# Patient Record
Sex: FEMALE | Race: OTHER | HISPANIC OR LATINO
[De-identification: names, ages, dates, MRNs, and addresses within clinical notes are randomized per-mention and may not be internally consistent; named-entity substitution may affect disease eponyms.]

---

## 2017-01-06 PROBLEM — Z00.00 ENCOUNTER FOR PREVENTIVE HEALTH EXAMINATION: Status: ACTIVE | Noted: 2017-01-06

## 2017-01-25 ENCOUNTER — APPOINTMENT (OUTPATIENT)
Dept: GYNECOLOGIC ONCOLOGY | Facility: CLINIC | Age: 42
End: 2017-01-25

## 2017-01-25 VITALS
BODY MASS INDEX: 29.92 KG/M2 | DIASTOLIC BLOOD PRESSURE: 72 MMHG | HEIGHT: 70 IN | HEART RATE: 100 BPM | WEIGHT: 209 LBS | SYSTOLIC BLOOD PRESSURE: 117 MMHG

## 2017-01-26 LAB
ALBUMIN SERPL ELPH-MCNC: 4.6 G/DL
ALP BLD-CCNC: 51 U/L
ALT SERPL-CCNC: 11 U/L
ANION GAP SERPL CALC-SCNC: 14 MMOL/L
APTT BLD: 32.8 SEC
AST SERPL-CCNC: 16 U/L
BASOPHILS # BLD AUTO: 0.03 K/UL
BASOPHILS NFR BLD AUTO: 0.4 %
BILIRUB SERPL-MCNC: 0.3 MG/DL
BUN SERPL-MCNC: 13 MG/DL
CALCIUM SERPL-MCNC: 9.7 MG/DL
CHLORIDE SERPL-SCNC: 104 MMOL/L
CO2 SERPL-SCNC: 22 MMOL/L
CREAT SERPL-MCNC: 0.86 MG/DL
EOSINOPHIL # BLD AUTO: 0.05 K/UL
EOSINOPHIL NFR BLD AUTO: 0.6 %
GLUCOSE SERPL-MCNC: 94 MG/DL
HCT VFR BLD CALC: 42.2 %
HGB BLD-MCNC: 13.4 G/DL
IMM GRANULOCYTES NFR BLD AUTO: 0.3 %
INR PPP: 0.98 RATIO
LYMPHOCYTES # BLD AUTO: 2.47 K/UL
LYMPHOCYTES NFR BLD AUTO: 32 %
MAN DIFF?: NORMAL
MCHC RBC-ENTMCNC: 29.6 PG
MCHC RBC-ENTMCNC: 31.8 GM/DL
MCV RBC AUTO: 93.2 FL
MONOCYTES # BLD AUTO: 0.32 K/UL
MONOCYTES NFR BLD AUTO: 4.2 %
NEUTROPHILS # BLD AUTO: 4.82 K/UL
NEUTROPHILS NFR BLD AUTO: 62.5 %
PLATELET # BLD AUTO: 309 K/UL
POTASSIUM SERPL-SCNC: 4.8 MMOL/L
PROT SERPL-MCNC: 7.1 G/DL
PT BLD: 11.1 SEC
RBC # BLD: 4.53 M/UL
RBC # FLD: 13.8 %
SODIUM SERPL-SCNC: 140 MMOL/L
WBC # FLD AUTO: 7.71 K/UL

## 2017-02-15 ENCOUNTER — RESULT REVIEW (OUTPATIENT)
Age: 42
End: 2017-02-15

## 2017-02-15 VITALS
RESPIRATION RATE: 16 BRPM | WEIGHT: 212.31 LBS | OXYGEN SATURATION: 98 % | TEMPERATURE: 98 F | SYSTOLIC BLOOD PRESSURE: 106 MMHG | HEIGHT: 70 IN | HEART RATE: 71 BPM | DIASTOLIC BLOOD PRESSURE: 71 MMHG

## 2017-02-16 ENCOUNTER — APPOINTMENT (OUTPATIENT)
Dept: GYNECOLOGIC ONCOLOGY | Facility: HOSPITAL | Age: 42
End: 2017-02-16

## 2017-02-16 ENCOUNTER — INPATIENT (INPATIENT)
Facility: HOSPITAL | Age: 42
LOS: 0 days | Discharge: HOME | DRG: 743 | End: 2017-02-17
Attending: OBSTETRICS & GYNECOLOGY | Admitting: OBSTETRICS & GYNECOLOGY
Payer: COMMERCIAL

## 2017-02-16 DIAGNOSIS — Z98.890 OTHER SPECIFIED POSTPROCEDURAL STATES: Chronic | ICD-10-CM

## 2017-02-16 PROCEDURE — 58552 LAPARO-VAG HYST INCL T/O: CPT | Mod: AS

## 2017-02-16 PROCEDURE — 58552 LAPARO-VAG HYST INCL T/O: CPT

## 2017-02-16 RX ORDER — IBUPROFEN 200 MG
600 TABLET ORAL EVERY 6 HOURS
Qty: 0 | Refills: 0 | Status: DISCONTINUED | OUTPATIENT
Start: 2017-02-16 | End: 2017-02-17

## 2017-02-16 RX ORDER — DEXTROSE MONOHYDRATE, SODIUM CHLORIDE, AND POTASSIUM CHLORIDE 50; .745; 4.5 G/1000ML; G/1000ML; G/1000ML
1000 INJECTION, SOLUTION INTRAVENOUS
Qty: 0 | Refills: 0 | Status: DISCONTINUED | OUTPATIENT
Start: 2017-02-16 | End: 2017-02-17

## 2017-02-16 RX ORDER — METOCLOPRAMIDE HCL 10 MG
10 TABLET ORAL EVERY 6 HOURS
Qty: 0 | Refills: 0 | Status: DISCONTINUED | OUTPATIENT
Start: 2017-02-16 | End: 2017-02-17

## 2017-02-16 RX ORDER — ONDANSETRON 8 MG/1
4 TABLET, FILM COATED ORAL ONCE
Qty: 0 | Refills: 0 | Status: DISCONTINUED | OUTPATIENT
Start: 2017-02-16 | End: 2017-02-17

## 2017-02-16 RX ORDER — MORPHINE SULFATE 50 MG/1
4 CAPSULE, EXTENDED RELEASE ORAL EVERY 4 HOURS
Qty: 0 | Refills: 0 | Status: DISCONTINUED | OUTPATIENT
Start: 2017-02-16 | End: 2017-02-17

## 2017-02-16 RX ORDER — ENOXAPARIN SODIUM 100 MG/ML
40 INJECTION SUBCUTANEOUS ONCE
Qty: 0 | Refills: 0 | Status: COMPLETED | OUTPATIENT
Start: 2017-02-16 | End: 2017-02-16

## 2017-02-16 RX ADMIN — ENOXAPARIN SODIUM 40 MILLIGRAM(S): 100 INJECTION SUBCUTANEOUS at 23:38

## 2017-02-16 RX ADMIN — Medication 600 MILLIGRAM(S): at 22:45

## 2017-02-16 RX ADMIN — Medication 600 MILLIGRAM(S): at 22:01

## 2017-02-16 NOTE — PROGRESS NOTE ADULT - SUBJECTIVE AND OBJECTIVE BOX
Subjective: Patient evaluated at bedside.     Patient denies fevers, chills, chest pain, shortness of breath, nausea, vomiting, diarrhea or constipation     Vital Signs Last 24 Hrs  T(C): 36.9, Max: 37 (02-16 @ 14:15)  T(F): 98.4, Max: 98.6 (02-16 @ 14:15)  HR: 67 (65 - 85)  BP: 101/65 (101/65 - 124/67)  BP(mean): --  RR: 16 (15 - 20)  SpO2: 97% (96% - 100%)  I&O's Summary    I & Os for current day (as of 16 Feb 2017 22:00)  =============================================  IN: 560 ml / OUT: 240 ml / NET: 320 ml    MEDICATIONS  (STANDING):  enoxaparin Injectable 40milliGRAM(s) SubCutaneous once  dextrose 5% + sodium chloride 0.45% with potassium chloride 20 mEq/L 1000milliLiter(s) IV Continuous <Continuous>    MEDICATIONS  (PRN):  ibuprofen  Tablet 600milliGRAM(s) Oral every 6 hours PRN Moderate pain  oxyCODONE  5 mG/acetaminophen 325 mG 2Tablet(s) Oral every 6 hours PRN Severe Pain  morphine  - Injectable 4milliGRAM(s) IV Push every 4 hours PRN Severe Pain  metoclopramide Injectable 10milliGRAM(s) IV Push every 6 hours PRN Nausea and/or Vomiting  ondansetron Injectable 4milliGRAM(s) IV Push once PRN Postoperative Nausea and/or Vomiting      ********** PHYSICAL EXAM   Constitutional: Alert & Oriented x3, No acute distress, cooperative   Respiratory: Clear to ausculation bilaterally; no wheezing, rhonchi, or crackles  Cardiovascular: S1 &S2 physiologic, no murmurs, or gallops  Gastrointestinal: soft, non tender, positive bowel sounds, no rebound or guarding   Incision: dry and intact, no erythema or induration.   Genitourinary:   Extremities: no calf tenderness or swelling    LABS:                RADIOLOGY & ADDITIONAL TESTS:

## 2017-02-16 NOTE — BRIEF OPERATIVE NOTE - PROCEDURE
Hysterectomy, total, robot-assisted, laparoscopic, with bilateral salpingectomy  02/16/2017    Active  ROBERT

## 2017-02-16 NOTE — PROGRESS NOTE ADULT - SUBJECTIVE AND OBJECTIVE BOX
Subjective: Patient evaluated at bedside. She is currently resting. She denies any pain. She has not required pain medication post operatively. She is tolerating sips.     Patient denies fevers, chills, chest pain, shortness of breath, nausea, vomiting, diarrhea or constipation     Vital Signs Last 24 Hrs  T(C): 36.9, Max: 37 (02-16 @ 14:15)  T(F): 98.4, Max: 98.6 (02-16 @ 14:15)  HR: 67 (65 - 85)  BP: 101/65 (101/65 - 124/67)  BP(mean): --  RR: 16 (15 - 20)  SpO2: 97% (96% - 100%)  I&O's Summary    I & Os for current day (as of 16 Feb 2017 22:37)  =============================================  IN: 560 ml / OUT: 240 ml / NET: 320 ml    MEDICATIONS  (STANDING):  enoxaparin Injectable 40milliGRAM(s) SubCutaneous once  dextrose 5% + sodium chloride 0.45% with potassium chloride 20 mEq/L 1000milliLiter(s) IV Continuous <Continuous>    MEDICATIONS  (PRN):  ibuprofen  Tablet 600milliGRAM(s) Oral every 6 hours PRN Moderate pain  oxyCODONE  5 mG/acetaminophen 325 mG 2Tablet(s) Oral every 6 hours PRN Severe Pain  morphine  - Injectable 4milliGRAM(s) IV Push every 4 hours PRN Severe Pain  metoclopramide Injectable 10milliGRAM(s) IV Push every 6 hours PRN Nausea and/or Vomiting  ondansetron Injectable 4milliGRAM(s) IV Push once PRN Postoperative Nausea and/or Vomiting      PHYSICAL EXAM   Constitutional: Alert & Oriented x3, No acute distress, cooperative   Gastrointestinal: soft, non-distended, mildly tender,no rebound or guarding   Incision: dry and intact, no erythema or induration.   Genitourinary: toussaint in place  Extremities: no calf tenderness or swelling, SCDs in place

## 2017-02-16 NOTE — PROGRESS NOTE ADULT - ASSESSMENT
A/P: POD0 RA TLH  1. FEN - D51/2NS+K@125, clears diet, regular in AM  2. PAIN - OPM, morphine for BT  3.  - toussaint in place, remove in AM  4. RESP -  IS  5. VTE prophylaxis - SCDs, Lovenox  6. LABS - AM CBC  7. DISPO - d/c in AM if all milestones met

## 2017-02-16 NOTE — PROGRESS NOTE ADULT - ASSESSMENT
42 y/o POD0 after RA TLH and BS   1. Neuro: Pain controlled with PO pain meds  2. CV:  D51/2 NS +KCl  3. Pulm: Patient encouraged to use incentive spirometer when awake. No issues   4. GI: no flatus. tolerating clears.   5. : Wilson with adequate UOP  6. DVT: SCDs and early ambulation. Lovenox @ 5649

## 2017-02-17 ENCOUNTER — TRANSCRIPTION ENCOUNTER (OUTPATIENT)
Age: 42
End: 2017-02-17

## 2017-02-17 VITALS
SYSTOLIC BLOOD PRESSURE: 106 MMHG | HEART RATE: 66 BPM | RESPIRATION RATE: 16 BRPM | OXYGEN SATURATION: 100 % | DIASTOLIC BLOOD PRESSURE: 64 MMHG | TEMPERATURE: 99 F

## 2017-02-17 LAB
EXTRA GREEN TOP TUBE: SIGNIFICANT CHANGE UP
HCT VFR BLD CALC: 38.6 % — SIGNIFICANT CHANGE UP (ref 34.5–45)
HGB BLD-MCNC: 13 G/DL — SIGNIFICANT CHANGE UP (ref 11.5–15.5)
MCHC RBC-ENTMCNC: 30 PG — SIGNIFICANT CHANGE UP (ref 27–34)
MCHC RBC-ENTMCNC: 33.7 G/DL — SIGNIFICANT CHANGE UP (ref 32–36)
MCV RBC AUTO: 88.9 FL — SIGNIFICANT CHANGE UP (ref 80–100)
PLATELET # BLD AUTO: 256 K/UL — SIGNIFICANT CHANGE UP (ref 150–400)
RBC # BLD: 4.34 M/UL — SIGNIFICANT CHANGE UP (ref 3.8–5.2)
RBC # FLD: 12.8 % — SIGNIFICANT CHANGE UP (ref 10.3–16.9)
WBC # BLD: 8.8 K/UL — SIGNIFICANT CHANGE UP (ref 3.8–10.5)
WBC # FLD AUTO: 8.8 K/UL — SIGNIFICANT CHANGE UP (ref 3.8–10.5)

## 2017-02-17 RX ORDER — OXYCODONE HYDROCHLORIDE 5 MG/1
2 TABLET ORAL
Qty: 0 | Refills: 0 | COMMUNITY
Start: 2017-02-17

## 2017-02-17 RX ORDER — SODIUM CHLORIDE 9 MG/ML
3 INJECTION INTRAMUSCULAR; INTRAVENOUS; SUBCUTANEOUS EVERY 8 HOURS
Qty: 0 | Refills: 0 | Status: DISCONTINUED | OUTPATIENT
Start: 2017-02-17 | End: 2017-02-17

## 2017-02-17 RX ORDER — DOCUSATE SODIUM 100 MG
100 CAPSULE ORAL
Qty: 0 | Refills: 0 | Status: DISCONTINUED | OUTPATIENT
Start: 2017-02-17 | End: 2017-02-17

## 2017-02-17 RX ORDER — IBUPROFEN 200 MG
1 TABLET ORAL
Qty: 0 | Refills: 0 | COMMUNITY
Start: 2017-02-17

## 2017-02-17 NOTE — DISCHARGE NOTE ADULT - CARE PROVIDERS DIRECT ADDRESSES
,[email or web address],DirectAddress_Unknown

## 2017-02-17 NOTE — DISCHARGE NOTE ADULT - CARE PLAN
Principal Discharge DX:	Complex endometrial hyperplasia without atypia  Goal:	N/A  Instructions for follow-up, activity and diet:	Please see Dr. Bryant in the office on 2/27 at 11am at [address] [place] [address]. You should not lift anything heavy or participate in exercise until cleared. Call the office if you experience heavy bleeding, severe pain, or fever over 100.4. Principal Discharge DX:	Complex endometrial hyperplasia without atypia  Goal:	N/A  Instructions for follow-up, activity and diet:	Please see Dr. Bryant in the office on 2/27 at 11am at [address] [place] [address]. You should not lift anything heavy or participate in exercise until cleared. Call the office if you experience heavy bleeding, severe pain, or fever over 100.4.

## 2017-02-17 NOTE — H&P ADULT. - HISTORY OF PRESENT ILLNESS
41 year old nulliparous patient with complex endometrial hyperplasia without atypia presents for scheduled robotic assisted hysterectomy

## 2017-02-17 NOTE — DISCHARGE NOTE ADULT - NS AS ACTIVITY OBS
Do not drive or operate machinery/Showering allowed/Walking-Indoors allowed/No Heavy lifting/straining/Walking-Outdoors allowed

## 2017-02-17 NOTE — PROGRESS NOTE ADULT - SUBJECTIVE AND OBJECTIVE BOX
INTERVAL HPI/OVERNIGHT EVENTS: Patient doing well. No complaints. Patient reports pain well controlled overnight. No nausea or vomiting. now s/p toussaint. anticipating voiding.       Vital Signs Last 24 Hrs  T(C): 36.1, Max: 37 (02-16 @ 14:15)  T(F): 97, Max: 98.6 (02-16 @ 14:15)  HR: 72 (60 - 85)  BP: 102/68 (97/58 - 124/67)  BP(mean): --  RR: 17 (15 - 20)  SpO2: 97% (96% - 100%)  I&O's Summary    I & Os for current day (as of 17 Feb 2017 07:34)  =============================================  IN: 1980 ml / OUT: 1990 ml / NET: -10 ml    MEDICATIONS  (STANDING):  sodium chloride 0.9% lock flush 3milliLiter(s) IV Push every 8 hours  docusate sodium 100milliGRAM(s) Oral two times a day    MEDICATIONS  (PRN):  ibuprofen  Tablet 600milliGRAM(s) Oral every 6 hours PRN Moderate pain  oxyCODONE  5 mG/acetaminophen 325 mG 2Tablet(s) Oral every 6 hours PRN Severe Pain  morphine  - Injectable 4milliGRAM(s) IV Push every 4 hours PRN Severe Pain  metoclopramide Injectable 10milliGRAM(s) IV Push every 6 hours PRN Nausea and/or Vomiting  ondansetron Injectable 4milliGRAM(s) IV Push once PRN Postoperative Nausea and/or Vomiting  	    PHYSICAL EXAM:      Constitutional: No apparent distress. Awake alert and oriented x3.     Respiratory: clear to auscultation bilaterally    Cardiovascular: regular rate and rhythm    Gastrointestinal: abodmen soft, non-tender, non-distended. incision sites c/d/i with dermabond    Genitourinary: s/p toussaint    Rectal:    Extremities:          LABS:      AM CBC pending          RADIOLOGY & ADDITIONAL TESTS:

## 2017-02-17 NOTE — DISCHARGE NOTE ADULT - HOSPITAL COURSE
Patient underwent robotic assisted total laparoscopic hysterectomy with bilateral salpingectomy. Uncomplicated procedure. Discharged home in stable condition on post-op day #1.

## 2017-02-17 NOTE — H&P ADULT. - ASSESSMENT
Complex endoemtrial hyperplasia without atypia. Informed consent obtained for robotic assisted total laparoscopic hysterectomy and salpingectomy.

## 2017-02-17 NOTE — DISCHARGE NOTE ADULT - CARE PROVIDER_API CALL
Birdie Bryant (DO), Gynecologic Oncology; Obstetrics and Gynecology  [address] [place] [address]  Phone: [phone]  Fax: [phone]

## 2017-02-17 NOTE — DISCHARGE NOTE ADULT - MEDICATION SUMMARY - MEDICATIONS TO TAKE
I will START or STAY ON the medications listed below when I get home from the hospital:    ibuprofen 600 mg oral tablet  -- 1 tab(s) by mouth every 6 hours, As needed, Moderate pain  -- Indication: For PAIN    acetaminophen-oxyCODONE 325 mg-5 mg oral tablet  -- 2 tab(s) by mouth every 6 hours, As needed, Severe Pain  -- Indication: For PAIN    Colace 100 mg oral capsule  -- 1 cap(s) by mouth 2 times a day  -- Indication: For CONSTIPATION

## 2017-02-17 NOTE — DISCHARGE NOTE ADULT - PLAN OF CARE
N/A Please see Dr. Bryant in the office on 2/27 at 11am at [address] [place] [address]. You should not lift anything heavy or participate in exercise until cleared. Call the office if you experience heavy bleeding, severe pain, or fever over 100.4.

## 2017-02-17 NOTE — DISCHARGE NOTE ADULT - PATIENT PORTAL LINK FT
“You can access the [facility] Patient Portal, offered by [facility], by registering with the following website: [email or web address]”

## 2017-02-17 NOTE — PROGRESS NOTE ADULT - ASSESSMENT
41 year old patient post-op day #1 s/p robotic assisted TLH salpingectomy  1. Vital signs stable, continue to monitor per protocol  2. Pain controlled with oral pain medication  3. DVT prophylaxis: lovenox ppx  4. GI/: low residue diet. heplock after voiding  5. Disposition: dc home after normal cbc

## 2017-02-20 LAB — NON-GYNECOLOGICAL CYTOLOGY STUDY: SIGNIFICANT CHANGE UP

## 2017-02-21 DIAGNOSIS — N85.01 BENIGN ENDOMETRIAL HYPERPLASIA: ICD-10-CM

## 2017-02-21 DIAGNOSIS — N83.11 CORPUS LUTEUM CYST OF RIGHT OVARY: ICD-10-CM

## 2017-02-21 DIAGNOSIS — D25.9 LEIOMYOMA OF UTERUS, UNSPECIFIED: ICD-10-CM

## 2017-02-21 LAB — SURGICAL PATHOLOGY STUDY: SIGNIFICANT CHANGE UP

## 2017-02-27 ENCOUNTER — APPOINTMENT (OUTPATIENT)
Dept: GYNECOLOGIC ONCOLOGY | Facility: CLINIC | Age: 42
End: 2017-02-27

## 2017-02-27 VITALS
HEIGHT: 70 IN | BODY MASS INDEX: 30.06 KG/M2 | DIASTOLIC BLOOD PRESSURE: 73 MMHG | WEIGHT: 210 LBS | SYSTOLIC BLOOD PRESSURE: 124 MMHG | HEART RATE: 89 BPM

## 2017-02-27 RX ORDER — IBUPROFEN 800 MG/1
800 TABLET, FILM COATED ORAL 3 TIMES DAILY
Qty: 30 | Refills: 0 | Status: COMPLETED | COMMUNITY
Start: 2017-02-10 | End: 2017-02-27

## 2017-02-27 RX ORDER — DOCUSATE SODIUM 100 MG/1
100 CAPSULE ORAL
Qty: 20 | Refills: 0 | Status: COMPLETED | COMMUNITY
Start: 2017-02-10 | End: 2017-02-27

## 2017-02-27 RX ORDER — OXYCODONE AND ACETAMINOPHEN 5; 325 MG/1; MG/1
5-325 TABLET ORAL
Qty: 30 | Refills: 0 | Status: COMPLETED | COMMUNITY
Start: 2017-02-10 | End: 2017-02-27

## 2017-04-03 ENCOUNTER — APPOINTMENT (OUTPATIENT)
Dept: GYNECOLOGIC ONCOLOGY | Facility: CLINIC | Age: 42
End: 2017-04-03

## 2017-04-03 VITALS
SYSTOLIC BLOOD PRESSURE: 122 MMHG | DIASTOLIC BLOOD PRESSURE: 66 MMHG | HEIGHT: 70 IN | HEART RATE: 56 BPM | WEIGHT: 217 LBS | BODY MASS INDEX: 31.07 KG/M2

## 2017-04-03 DIAGNOSIS — N85.01 BENIGN ENDOMETRIAL HYPERPLASIA: ICD-10-CM

## 2018-02-23 RX ORDER — DOCUSATE SODIUM 100 MG
1 CAPSULE ORAL
Qty: 0 | Refills: 0 | COMMUNITY

## 2019-01-01 NOTE — PROGRESS NOTE ADULT - SUBJECTIVE AND OBJECTIVE BOX
POSTOP CHECK    Patient evaluated at the bedside. No acute events.  Denies CP/SOB/dizziness/nausea/vomiting/abdominal pain/calf pain. Wilson in place - adequate UOP. No vaginal bleeding.  Pain well controlled on oral pain medications. Not ambulating. No flatus. Tolerating clears diet.    T(C): 36.8, Max: 37 (02-16 @ 14:15)  HR: 72 (65 - 85)  BP: 105/65 (105/65 - 124/67)  RR: 17 (15 - 20)  SpO2: 96% (96% - 100%)    GEN: patient appears well  LUNGS: no respiratory distress  ABD: soft, NT, ND, incisions c/d/i  EXT: no calf tenderness        I & Os for current day (as of 02-16 @ 19:07)  =============================================  IN: 560 ml / OUT: 240 ml / NET: 320 ml Statement Selected

## 2025-01-10 NOTE — PATIENT PROFILE ADULT. - FUNCTIONAL SCREEN CURRENT LEVEL: BATHING, MLM
Pleasure seeing you today.     Your kidney function is stable with a creatinine of 1.76 mg/dL and a GFR of 39 mL/min.  Please continue to avoid NSAIDs such as Advil, Motrin, Aleve, ibuprofen and naproxen.  Please continue to follow 2 g sodium restriction.    Please continue checking blood pressure at ho provider visits. If blood pressure upper number is consistently less than 100 or greater than 150 please contact the office.  Please also call the office if you are experiencing increased headaches, dizziness, lightheadedness, chest pain or blurred vision.  Please continue to maintain a 2 g sodium restriction.    Please start taking vitamin D supplement (wither D2 or D#) 2000 units per day.     Please continue taking all medications as previously ordered.    Please return for follow-up appointment in 6 months with lab work completed prior to that visit.    (0) independent

## 2025-07-22 NOTE — PROGRESS NOTE ADULT - PROVIDER SPECIALTY LIST ADULT
Copied from CRM #[id number]. Topic: General Inquiry - Return Call  >> Jul 22, 2025  9:16 AM Yael wrote:  .Type:  Needs Medical Advice    Who Called: pt layne Alacla    Would the patient rather a call back or a response via [employer]? Call back   Best Call Back Number: [phone]  Additional Information:      Pt mom stated she would like a call back regarding pt medication  >> Jul 22, 2025  9:44 AM Med Assistant Robert wrote:    ----- Message -----  From: Yael Amaya  Sent: 7/22/2025   9:17 AM CDT  To: Domenica Vo     GYN